# Patient Record
Sex: FEMALE | Race: BLACK OR AFRICAN AMERICAN | URBAN - METROPOLITAN AREA
[De-identification: names, ages, dates, MRNs, and addresses within clinical notes are randomized per-mention and may not be internally consistent; named-entity substitution may affect disease eponyms.]

---

## 2021-09-13 ENCOUNTER — HISTORICAL (OUTPATIENT)
Dept: ADMINISTRATIVE | Facility: HOSPITAL | Age: 81
End: 2021-09-13

## 2021-09-23 ENCOUNTER — HISTORICAL (OUTPATIENT)
Dept: RADIOLOGY | Facility: HOSPITAL | Age: 81
End: 2021-09-23

## 2021-11-30 LAB
ABS NEUT (OLG): 11.12 X10(3)/MCL (ref 2.1–9.2)
ALBUMIN SERPL-MCNC: 2.8 GM/DL (ref 3.4–4.8)
ALBUMIN/GLOB SERPL: 0.6 RATIO (ref 1.1–2)
ALP SERPL-CCNC: 143 UNIT/L (ref 40–150)
ALT SERPL-CCNC: 51 UNIT/L (ref 0–55)
AST SERPL-CCNC: 58 UNIT/L (ref 5–34)
BASOPHILS # BLD AUTO: 0.06 X10(3)/MCL (ref 0–0.2)
BASOPHILS NFR BLD AUTO: 0.4 % (ref 0–1)
BILIRUB SERPL-MCNC: 1 MG/DL (ref 0.2–1.2)
BILIRUBIN DIRECT+TOT PNL SERPL-MCNC: 0.5 MG/DL (ref 0–0.5)
BILIRUBIN DIRECT+TOT PNL SERPL-MCNC: 0.5 MG/DL (ref 0–0.8)
BUN SERPL-MCNC: 10.6 MG/DL (ref 9.8–20.1)
CALCIUM SERPL-MCNC: 9.7 MG/DL (ref 8.4–10.2)
CHLORIDE SERPL-SCNC: 105 MMOL/L (ref 98–107)
CHOLEST SERPL-MCNC: 111 MG/DL
CHOLEST/HDLC SERPL: 4 {RATIO} (ref 0–5)
CO2 SERPL-SCNC: 26 MMOL/L (ref 23–31)
CREAT SERPL-MCNC: 0.86 MG/DL (ref 0.57–1.11)
EOSINOPHIL # BLD AUTO: 0.4 X10(3)/MCL (ref 0–0.9)
EOSINOPHIL NFR BLD AUTO: 2.6 % (ref 0–6.4)
ERYTHROCYTE [DISTWIDTH] IN BLOOD BY AUTOMATED COUNT: 13.5 % (ref 11.5–17)
FERRITIN SERPL-MCNC: 2173.4 NG/ML (ref 4.63–204)
GLOBULIN SER-MCNC: 4.5 GM/DL (ref 2.4–3.5)
GLUCOSE SERPL-MCNC: 93 MG/DL (ref 82–115)
HCT VFR BLD AUTO: 34 % (ref 37–47)
HDLC SERPL-MCNC: 31 MG/DL (ref 40–60)
HGB BLD-MCNC: 11.2 GM/DL (ref 12–16)
IMM GRANULOCYTES # BLD AUTO: 0.09 10*3/UL (ref 0–0.02)
IMM GRANULOCYTES NFR BLD AUTO: 0.6 % (ref 0–0.43)
IRON SATN MFR SERPL: 27 % (ref 20–50)
IRON SERPL-MCNC: 39 UG/DL (ref 50–170)
LDLC SERPL CALC-MCNC: 58 MG/DL (ref 50–140)
LYMPHOCYTES # BLD AUTO: 2.98 X10(3)/MCL (ref 0.6–4.6)
LYMPHOCYTES NFR BLD AUTO: 19.6 % (ref 16–44)
MCH RBC QN AUTO: 31.2 PG (ref 27–31)
MCHC RBC AUTO-ENTMCNC: 32.9 GM/DL (ref 33–36)
MCV RBC AUTO: 94.7 FL (ref 80–94)
MONOCYTES # BLD AUTO: 0.56 X10(3)/MCL (ref 0.1–1.3)
MONOCYTES NFR BLD AUTO: 3.7 % (ref 4–12.1)
NEUTROPHILS # BLD AUTO: 11.12 X10(3)/MCL (ref 2.1–9.2)
NEUTROPHILS NFR BLD AUTO: 73.1 % (ref 43–73)
NRBC BLD AUTO-RTO: 0 % (ref 0–0.2)
PLATELET # BLD AUTO: 289 X10(3)/MCL (ref 130–400)
PMV BLD AUTO: 9.2 FL (ref 7.4–10.4)
POTASSIUM SERPL-SCNC: 4.2 MMOL/L (ref 3.5–5.1)
PREALB SERPL-MCNC: 10.8 MG/DL (ref 14–37)
PROT SERPL-MCNC: 7.3 GM/DL (ref 5.8–7.6)
RBC # BLD AUTO: 3.59 X10(6)/MCL (ref 4.2–5.4)
SODIUM SERPL-SCNC: 140 MMOL/L (ref 136–145)
TIBC SERPL-MCNC: 108 UG/DL (ref 70–310)
TIBC SERPL-MCNC: 147 UG/DL (ref 250–450)
TRANSFERRIN SERPL-MCNC: 131 MG/DL
TRIGL SERPL-MCNC: 109 MG/DL (ref 0–150)
VLDLC SERPL CALC-MCNC: 22 MG/DL
WBC # SPEC AUTO: 15.2 X10(3)/MCL (ref 4.5–11.5)

## 2021-12-01 LAB
BUN SERPL-MCNC: 12.5 MG/DL (ref 9.8–20.1)
CALCIUM SERPL-MCNC: 9.6 MG/DL (ref 8.4–10.2)
CHLORIDE SERPL-SCNC: 104 MMOL/L (ref 98–107)
CO2 SERPL-SCNC: 28 MMOL/L (ref 23–31)
CREAT SERPL-MCNC: 0.91 MG/DL (ref 0.57–1.11)
CREAT/UREA NIT SERPL: 14
GLUCOSE SERPL-MCNC: 97 MG/DL (ref 82–115)
POTASSIUM SERPL-SCNC: 4.4 MMOL/L (ref 3.5–5.1)
SODIUM SERPL-SCNC: 141 MMOL/L (ref 136–145)

## 2021-12-03 LAB
BUN SERPL-MCNC: 12.3 MG/DL (ref 9.8–20.1)
CALCIUM SERPL-MCNC: 9.6 MG/DL (ref 8.4–10.2)
CHLORIDE SERPL-SCNC: 103 MMOL/L (ref 98–107)
CO2 SERPL-SCNC: 27 MMOL/L (ref 23–31)
CREAT SERPL-MCNC: 0.89 MG/DL (ref 0.57–1.11)
CREAT/UREA NIT SERPL: 14
GLUCOSE SERPL-MCNC: 93 MG/DL (ref 82–115)
POTASSIUM SERPL-SCNC: 4.2 MMOL/L (ref 3.5–5.1)
SODIUM SERPL-SCNC: 138 MMOL/L (ref 136–145)

## 2021-12-06 LAB
ABS NEUT (OLG): 10.85 X10(3)/MCL (ref 2.1–9.2)
ALBUMIN SERPL-MCNC: 2.8 GM/DL (ref 3.4–4.8)
ALBUMIN/GLOB SERPL: 0.7 RATIO (ref 1.1–2)
ALP SERPL-CCNC: 146 UNIT/L (ref 40–150)
ALT SERPL-CCNC: 52 UNIT/L (ref 0–55)
AST SERPL-CCNC: 51 UNIT/L (ref 5–34)
BASOPHILS # BLD AUTO: 0.08 X10(3)/MCL (ref 0–0.2)
BASOPHILS NFR BLD AUTO: 0.5 % (ref 0–1)
BILIRUB SERPL-MCNC: 0.7 MG/DL (ref 0.2–1.2)
BILIRUBIN DIRECT+TOT PNL SERPL-MCNC: 0.3 MG/DL (ref 0–0.5)
BILIRUBIN DIRECT+TOT PNL SERPL-MCNC: 0.4 MG/DL (ref 0–0.8)
BUN SERPL-MCNC: 12.2 MG/DL (ref 9.8–20.1)
BUN SERPL-MCNC: 12.2 MG/DL (ref 9.8–20.1)
CALCIUM SERPL-MCNC: 10 MG/DL (ref 8.4–10.2)
CALCIUM SERPL-MCNC: 10 MG/DL (ref 8.4–10.2)
CHLORIDE SERPL-SCNC: 100 MMOL/L (ref 98–107)
CHLORIDE SERPL-SCNC: 100 MMOL/L (ref 98–107)
CO2 SERPL-SCNC: 27 MMOL/L (ref 23–31)
CO2 SERPL-SCNC: 27 MMOL/L (ref 23–31)
CREAT SERPL-MCNC: 0.87 MG/DL (ref 0.57–1.11)
CREAT SERPL-MCNC: 0.87 MG/DL (ref 0.57–1.11)
CREAT/UREA NIT SERPL: 14
EOSINOPHIL # BLD AUTO: 0.39 X10(3)/MCL (ref 0–0.9)
EOSINOPHIL NFR BLD AUTO: 2.6 % (ref 0–6.4)
ERYTHROCYTE [DISTWIDTH] IN BLOOD BY AUTOMATED COUNT: 13.2 % (ref 11.5–17)
GLOBULIN SER-MCNC: 4.2 GM/DL (ref 2.4–3.5)
GLUCOSE SERPL-MCNC: 92 MG/DL (ref 82–115)
GLUCOSE SERPL-MCNC: 92 MG/DL (ref 82–115)
HCT VFR BLD AUTO: 30.2 % (ref 37–47)
HGB BLD-MCNC: 10 GM/DL (ref 12–16)
IMM GRANULOCYTES # BLD AUTO: 0.09 10*3/UL (ref 0–0.02)
IMM GRANULOCYTES NFR BLD AUTO: 0.6 % (ref 0–0.43)
LYMPHOCYTES # BLD AUTO: 3.09 X10(3)/MCL (ref 0.6–4.6)
LYMPHOCYTES NFR BLD AUTO: 20.3 % (ref 16–44)
MCH RBC QN AUTO: 31 PG (ref 27–31)
MCHC RBC AUTO-ENTMCNC: 33.1 GM/DL (ref 33–36)
MCV RBC AUTO: 93.5 FL (ref 80–94)
MONOCYTES # BLD AUTO: 0.73 X10(3)/MCL (ref 0.1–1.3)
MONOCYTES NFR BLD AUTO: 4.8 % (ref 4–12.1)
NEUTROPHILS # BLD AUTO: 10.85 X10(3)/MCL (ref 2.1–9.2)
NEUTROPHILS NFR BLD AUTO: 71.2 % (ref 43–73)
NRBC BLD AUTO-RTO: 0 % (ref 0–0.2)
PLATELET # BLD AUTO: 255 X10(3)/MCL (ref 130–400)
PMV BLD AUTO: 9.2 FL (ref 7.4–10.4)
POTASSIUM SERPL-SCNC: 4 MMOL/L (ref 3.5–5.1)
POTASSIUM SERPL-SCNC: 4 MMOL/L (ref 3.5–5.1)
PREALB SERPL-MCNC: 12.4 MG/DL (ref 14–37)
PROT SERPL-MCNC: 7 GM/DL (ref 5.8–7.6)
RBC # BLD AUTO: 3.23 X10(6)/MCL (ref 4.2–5.4)
SODIUM SERPL-SCNC: 137 MMOL/L (ref 136–145)
SODIUM SERPL-SCNC: 137 MMOL/L (ref 136–145)
WBC # SPEC AUTO: 15.2 X10(3)/MCL (ref 4.5–11.5)

## 2021-12-08 ENCOUNTER — HISTORICAL (OUTPATIENT)
Dept: ADMINISTRATIVE | Facility: HOSPITAL | Age: 81
End: 2021-12-08

## 2021-12-08 LAB
APPEARANCE, UA: ABNORMAL
BILIRUB UR QL STRIP: NEGATIVE
BUN SERPL-MCNC: 15.4 MG/DL (ref 9.8–20.1)
CALCIUM SERPL-MCNC: 10.3 MG/DL (ref 8.4–10.2)
CHLORIDE SERPL-SCNC: 101 MMOL/L (ref 98–107)
CO2 SERPL-SCNC: 26 MMOL/L (ref 23–31)
COLOR UR: YELLOW
CREAT SERPL-MCNC: 0.87 MG/DL (ref 0.57–1.11)
CREAT/UREA NIT SERPL: 18
GLUCOSE (UA): NEGATIVE
GLUCOSE SERPL-MCNC: 89 MG/DL (ref 82–115)
HGB UR QL STRIP: NEGATIVE
KETONES UR QL STRIP: NEGATIVE
LEUKOCYTE ESTERASE UR QL STRIP: NEGATIVE
NITRITE UR QL STRIP: NEGATIVE
PH UR STRIP: 7.5 [PH] (ref 5–7)
POTASSIUM SERPL-SCNC: 3.8 MMOL/L (ref 3.5–5.1)
PROT UR QL STRIP: NEGATIVE
SODIUM SERPL-SCNC: 137 MMOL/L (ref 136–145)
SP GR UR STRIP: 1.01 (ref 1–1.03)
UROBILINOGEN UR STRIP-ACNC: NEGATIVE

## 2021-12-09 LAB
ABS NEUT (OLG): 12.93 X10(3)/MCL (ref 2.1–9.2)
ALBUMIN SERPL-MCNC: 2.8 GM/DL (ref 3.4–4.8)
ALBUMIN/GLOB SERPL: 0.6 RATIO (ref 1.1–2)
ALP SERPL-CCNC: 144 UNIT/L (ref 40–150)
ALT SERPL-CCNC: 53 UNIT/L (ref 0–55)
AST SERPL-CCNC: 48 UNIT/L (ref 5–34)
BASOPHILS # BLD AUTO: 0.09 X10(3)/MCL (ref 0–0.2)
BASOPHILS NFR BLD AUTO: 0.5 % (ref 0–1)
BILIRUB SERPL-MCNC: 0.7 MG/DL (ref 0.2–1.2)
BILIRUBIN DIRECT+TOT PNL SERPL-MCNC: 0.3 MG/DL (ref 0–0.8)
BILIRUBIN DIRECT+TOT PNL SERPL-MCNC: 0.4 MG/DL (ref 0–0.5)
BUN SERPL-MCNC: 14.1 MG/DL (ref 9.8–20.1)
CALCIUM SERPL-MCNC: 9.8 MG/DL (ref 8.4–10.2)
CHLORIDE SERPL-SCNC: 102 MMOL/L (ref 98–107)
CO2 SERPL-SCNC: 24 MMOL/L (ref 23–31)
CREAT SERPL-MCNC: 0.81 MG/DL (ref 0.57–1.11)
EOSINOPHIL # BLD AUTO: 0.27 X10(3)/MCL (ref 0–0.9)
EOSINOPHIL NFR BLD AUTO: 1.6 % (ref 0–6.4)
ERYTHROCYTE [DISTWIDTH] IN BLOOD BY AUTOMATED COUNT: 13.1 % (ref 11.5–17)
GLOBULIN SER-MCNC: 4.6 GM/DL (ref 2.4–3.5)
GLUCOSE SERPL-MCNC: 96 MG/DL (ref 82–115)
HCT VFR BLD AUTO: 32.7 % (ref 37–47)
HGB BLD-MCNC: 10.9 GM/DL (ref 12–16)
IMM GRANULOCYTES # BLD AUTO: 0.1 10*3/UL (ref 0–0.02)
IMM GRANULOCYTES NFR BLD AUTO: 0.6 % (ref 0–0.43)
LYMPHOCYTES # BLD AUTO: 3 X10(3)/MCL (ref 0.6–4.6)
LYMPHOCYTES NFR BLD AUTO: 17.4 % (ref 16–44)
MCH RBC QN AUTO: 31.1 PG (ref 27–31)
MCHC RBC AUTO-ENTMCNC: 33.3 GM/DL (ref 33–36)
MCV RBC AUTO: 93.2 FL (ref 80–94)
MONOCYTES # BLD AUTO: 0.87 X10(3)/MCL (ref 0.1–1.3)
MONOCYTES NFR BLD AUTO: 5 % (ref 4–12.1)
NEUTROPHILS # BLD AUTO: 12.93 X10(3)/MCL (ref 2.1–9.2)
NEUTROPHILS NFR BLD AUTO: 74.9 % (ref 43–73)
NRBC BLD AUTO-RTO: 0 % (ref 0–0.2)
PLATELET # BLD AUTO: 264 X10(3)/MCL (ref 130–400)
PMV BLD AUTO: 10.4 FL (ref 7.4–10.4)
POTASSIUM SERPL-SCNC: 3.7 MMOL/L (ref 3.5–5.1)
PROT SERPL-MCNC: 7.4 GM/DL (ref 5.8–7.6)
RBC # BLD AUTO: 3.51 X10(6)/MCL (ref 4.2–5.4)
SODIUM SERPL-SCNC: 138 MMOL/L (ref 136–145)
WBC # SPEC AUTO: 17.3 X10(3)/MCL (ref 4.5–11.5)

## 2021-12-10 LAB
ABS NEUT (OLG): 14.25 X10(3)/MCL (ref 2.1–9.2)
BASOPHILS # BLD AUTO: 0.06 X10(3)/MCL (ref 0–0.2)
BASOPHILS NFR BLD AUTO: 0.3 % (ref 0–1)
BUN SERPL-MCNC: 15.4 MG/DL (ref 9.8–20.1)
CALCIUM SERPL-MCNC: 10.3 MG/DL (ref 8.4–10.2)
CHLORIDE SERPL-SCNC: 102 MMOL/L (ref 98–107)
CO2 SERPL-SCNC: 25 MMOL/L (ref 23–31)
CREAT SERPL-MCNC: 0.85 MG/DL (ref 0.57–1.11)
CREAT/UREA NIT SERPL: 18
EOSINOPHIL # BLD AUTO: 0.22 X10(3)/MCL (ref 0–0.9)
EOSINOPHIL NFR BLD AUTO: 1.2 % (ref 0–6.4)
ERYTHROCYTE [DISTWIDTH] IN BLOOD BY AUTOMATED COUNT: 13.2 % (ref 11.5–17)
GLUCOSE SERPL-MCNC: 94 MG/DL (ref 82–115)
HCT VFR BLD AUTO: 29.6 % (ref 37–47)
HGB BLD-MCNC: 9.9 GM/DL (ref 12–16)
IMM GRANULOCYTES # BLD AUTO: 0.13 10*3/UL (ref 0–0.02)
IMM GRANULOCYTES NFR BLD AUTO: 0.7 % (ref 0–0.43)
LYMPHOCYTES # BLD AUTO: 3.14 X10(3)/MCL (ref 0.6–4.6)
LYMPHOCYTES NFR BLD AUTO: 16.8 % (ref 16–44)
MAGNESIUM SERPL-MCNC: 1.9 MG/DL (ref 1.6–2.6)
MCH RBC QN AUTO: 30.6 PG (ref 27–31)
MCHC RBC AUTO-ENTMCNC: 33.4 GM/DL (ref 33–36)
MCV RBC AUTO: 91.4 FL (ref 80–94)
MONOCYTES # BLD AUTO: 0.91 X10(3)/MCL (ref 0.1–1.3)
MONOCYTES NFR BLD AUTO: 4.9 % (ref 4–12.1)
NEUTROPHILS # BLD AUTO: 14.25 X10(3)/MCL (ref 2.1–9.2)
NEUTROPHILS NFR BLD AUTO: 76.1 % (ref 43–73)
NRBC BLD AUTO-RTO: 0 % (ref 0–0.2)
PLATELET # BLD AUTO: 224 X10(3)/MCL (ref 130–400)
PMV BLD AUTO: 9.6 FL (ref 7.4–10.4)
POTASSIUM SERPL-SCNC: 4.1 MMOL/L (ref 3.5–5.1)
RBC # BLD AUTO: 3.24 X10(6)/MCL (ref 4.2–5.4)
SODIUM SERPL-SCNC: 138 MMOL/L (ref 136–145)
WBC # SPEC AUTO: 18.7 X10(3)/MCL (ref 4.5–11.5)

## 2021-12-11 LAB
ABS NEUT (OLG): 14.76 X10(3)/MCL (ref 2.1–9.2)
ALBUMIN SERPL-MCNC: 2.8 GM/DL (ref 3.4–4.8)
ALBUMIN/GLOB SERPL: 0.6 RATIO (ref 1.1–2)
ALP SERPL-CCNC: 149 UNIT/L (ref 40–150)
ALT SERPL-CCNC: 105 UNIT/L (ref 0–55)
AST SERPL-CCNC: 90 UNIT/L (ref 5–34)
BASOPHILS # BLD AUTO: 0.09 X10(3)/MCL (ref 0–0.2)
BASOPHILS NFR BLD AUTO: 0.5 % (ref 0–1)
BILIRUB SERPL-MCNC: 0.7 MG/DL (ref 0.2–1.2)
BILIRUBIN DIRECT+TOT PNL SERPL-MCNC: 0.3 MG/DL (ref 0–0.5)
BILIRUBIN DIRECT+TOT PNL SERPL-MCNC: 0.4 MG/DL (ref 0–0.8)
BUN SERPL-MCNC: 15 MG/DL (ref 9.8–20.1)
CALCIUM SERPL-MCNC: 9.8 MG/DL (ref 8.4–10.2)
CHLORIDE SERPL-SCNC: 103 MMOL/L (ref 98–107)
CO2 SERPL-SCNC: 24 MMOL/L (ref 23–31)
CREAT SERPL-MCNC: 0.84 MG/DL (ref 0.57–1.11)
EOSINOPHIL # BLD AUTO: 0.35 X10(3)/MCL (ref 0–0.9)
EOSINOPHIL NFR BLD AUTO: 1.8 % (ref 0–6.4)
ERYTHROCYTE [DISTWIDTH] IN BLOOD BY AUTOMATED COUNT: 13.1 % (ref 11.5–17)
GLOBULIN SER-MCNC: 4.4 GM/DL (ref 2.4–3.5)
GLUCOSE SERPL-MCNC: 96 MG/DL (ref 82–115)
HCT VFR BLD AUTO: 28.6 % (ref 37–47)
HGB BLD-MCNC: 9.8 GM/DL (ref 12–16)
IMM GRANULOCYTES # BLD AUTO: 0.1 10*3/UL (ref 0–0.02)
IMM GRANULOCYTES NFR BLD AUTO: 0.5 % (ref 0–0.43)
LYMPHOCYTES # BLD AUTO: 3 X10(3)/MCL (ref 0.6–4.6)
LYMPHOCYTES NFR BLD AUTO: 15.7 % (ref 16–44)
MCH RBC QN AUTO: 31.6 PG (ref 27–31)
MCHC RBC AUTO-ENTMCNC: 34.3 GM/DL (ref 33–36)
MCV RBC AUTO: 92.3 FL (ref 80–94)
MONOCYTES # BLD AUTO: 0.84 X10(3)/MCL (ref 0.1–1.3)
MONOCYTES NFR BLD AUTO: 4.4 % (ref 4–12.1)
NEUTROPHILS # BLD AUTO: 14.76 X10(3)/MCL (ref 2.1–9.2)
NEUTROPHILS NFR BLD AUTO: 77.1 % (ref 43–73)
NRBC BLD AUTO-RTO: 0 % (ref 0–0.2)
PLATELET # BLD AUTO: 234 X10(3)/MCL (ref 130–400)
PMV BLD AUTO: 9.4 FL (ref 7.4–10.4)
POTASSIUM SERPL-SCNC: 3.5 MMOL/L (ref 3.5–5.1)
PROT SERPL-MCNC: 7.2 GM/DL (ref 5.8–7.6)
RBC # BLD AUTO: 3.1 X10(6)/MCL (ref 4.2–5.4)
SODIUM SERPL-SCNC: 138 MMOL/L (ref 136–145)
WBC # SPEC AUTO: 19.1 X10(3)/MCL (ref 4.5–11.5)

## 2021-12-13 LAB
ABS NEUT (OLG): 11.37 X10(3)/MCL (ref 2.1–9.2)
ALBUMIN SERPL-MCNC: 2.7 GM/DL (ref 3.4–4.8)
ALBUMIN/GLOB SERPL: 0.6 RATIO (ref 1.1–2)
ALP SERPL-CCNC: 158 UNIT/L (ref 40–150)
ALT SERPL-CCNC: 99 UNIT/L (ref 0–55)
AST SERPL-CCNC: 74 UNIT/L (ref 5–34)
BASOPHILS # BLD AUTO: 0.07 X10(3)/MCL (ref 0–0.2)
BASOPHILS NFR BLD AUTO: 0.4 % (ref 0–1)
BILIRUB SERPL-MCNC: 0.7 MG/DL (ref 0.2–1.2)
BILIRUBIN DIRECT+TOT PNL SERPL-MCNC: 0.3 MG/DL (ref 0–0.5)
BILIRUBIN DIRECT+TOT PNL SERPL-MCNC: 0.4 MG/DL (ref 0–0.8)
BUN SERPL-MCNC: 11.4 MG/DL (ref 9.8–20.1)
CALCIUM SERPL-MCNC: 10.1 MG/DL (ref 8.4–10.2)
CHLORIDE SERPL-SCNC: 104 MMOL/L (ref 98–107)
CO2 SERPL-SCNC: 26 MMOL/L (ref 23–31)
CREAT SERPL-MCNC: 0.88 MG/DL (ref 0.57–1.11)
EOSINOPHIL # BLD AUTO: 0.43 X10(3)/MCL (ref 0–0.9)
EOSINOPHIL NFR BLD AUTO: 2.7 % (ref 0–6.4)
ERYTHROCYTE [DISTWIDTH] IN BLOOD BY AUTOMATED COUNT: 13 % (ref 11.5–17)
GLOBULIN SER-MCNC: 4.3 GM/DL (ref 2.4–3.5)
GLUCOSE SERPL-MCNC: 87 MG/DL (ref 82–115)
HCT VFR BLD AUTO: 28.1 % (ref 37–47)
HGB BLD-MCNC: 9.2 GM/DL (ref 12–16)
IMM GRANULOCYTES # BLD AUTO: 0.1 10*3/UL (ref 0–0.02)
IMM GRANULOCYTES NFR BLD AUTO: 0.6 % (ref 0–0.43)
LYMPHOCYTES # BLD AUTO: 2.95 X10(3)/MCL (ref 0.6–4.6)
LYMPHOCYTES NFR BLD AUTO: 18.7 % (ref 16–44)
MCH RBC QN AUTO: 30.2 PG (ref 27–31)
MCHC RBC AUTO-ENTMCNC: 32.7 GM/DL (ref 33–36)
MCV RBC AUTO: 92.1 FL (ref 80–94)
MONOCYTES # BLD AUTO: 0.85 X10(3)/MCL (ref 0.1–1.3)
MONOCYTES NFR BLD AUTO: 5.4 % (ref 4–12.1)
NEUTROPHILS # BLD AUTO: 11.37 X10(3)/MCL (ref 2.1–9.2)
NEUTROPHILS NFR BLD AUTO: 72.2 % (ref 43–73)
NRBC BLD AUTO-RTO: 0 % (ref 0–0.2)
PLATELET # BLD AUTO: 191 X10(3)/MCL (ref 130–400)
PMV BLD AUTO: 9.9 FL (ref 7.4–10.4)
POTASSIUM SERPL-SCNC: 3.6 MMOL/L (ref 3.5–5.1)
PREALB SERPL-MCNC: 9 MG/DL (ref 14–37)
PROT SERPL-MCNC: 7 GM/DL (ref 5.8–7.6)
RBC # BLD AUTO: 3.05 X10(6)/MCL (ref 4.2–5.4)
SODIUM SERPL-SCNC: 140 MMOL/L (ref 136–145)
WBC # SPEC AUTO: 15.8 X10(3)/MCL (ref 4.5–11.5)

## 2021-12-14 LAB
PHOSPHATE SERPL-MCNC: 3.8 MG/DL (ref 2.3–4.7)
PTH-INTACT SERPL-MCNC: 17 PG/ML (ref 8.7–77.1)

## 2021-12-15 LAB
BUN SERPL-MCNC: 14 MG/DL (ref 9.8–20.1)
CALCIUM SERPL-MCNC: 10.4 MG/DL (ref 8.4–10.2)
CHLORIDE SERPL-SCNC: 103 MMOL/L (ref 98–107)
CO2 SERPL-SCNC: 24 MMOL/L (ref 23–31)
CREAT SERPL-MCNC: 0.88 MG/DL (ref 0.57–1.11)
CREAT/UREA NIT SERPL: 16
GLUCOSE SERPL-MCNC: 101 MG/DL (ref 82–115)
POTASSIUM SERPL-SCNC: 3.9 MMOL/L (ref 3.5–5.1)
SODIUM SERPL-SCNC: 139 MMOL/L (ref 136–145)

## 2021-12-16 LAB
BUN SERPL-MCNC: 12.8 MG/DL (ref 9.8–20.1)
CALCIUM SERPL-MCNC: 10.5 MG/DL (ref 8.4–10.2)
CHLORIDE SERPL-SCNC: 101 MMOL/L (ref 98–107)
CO2 SERPL-SCNC: 24 MMOL/L (ref 23–31)
CREAT SERPL-MCNC: 0.86 MG/DL (ref 0.57–1.11)
CREAT/UREA NIT SERPL: 15
GLUCOSE SERPL-MCNC: 95 MG/DL (ref 82–115)
POTASSIUM SERPL-SCNC: 3.9 MMOL/L (ref 3.5–5.1)
SODIUM SERPL-SCNC: 136 MMOL/L (ref 136–145)

## 2021-12-17 LAB
ABS NEUT (OLG): 13.25 X10(3)/MCL (ref 2.1–9.2)
ALBUMIN SERPL-MCNC: 2.6 GM/DL (ref 3.4–4.8)
ALBUMIN/GLOB SERPL: 0.6 RATIO (ref 1.1–2)
ALP SERPL-CCNC: 151 UNIT/L (ref 40–150)
ALT SERPL-CCNC: 68 UNIT/L (ref 0–55)
AST SERPL-CCNC: 48 UNIT/L (ref 5–34)
BASOPHILS # BLD AUTO: 0.05 X10(3)/MCL (ref 0–0.2)
BASOPHILS NFR BLD AUTO: 0.3 % (ref 0–1)
BILIRUB SERPL-MCNC: 0.7 MG/DL (ref 0.2–1.2)
BILIRUBIN DIRECT+TOT PNL SERPL-MCNC: 0.3 MG/DL (ref 0–0.5)
BILIRUBIN DIRECT+TOT PNL SERPL-MCNC: 0.4 MG/DL (ref 0–0.8)
BUN SERPL-MCNC: 14.4 MG/DL (ref 9.8–20.1)
CALCIUM SERPL-MCNC: 10.2 MG/DL (ref 8.4–10.2)
CHLORIDE SERPL-SCNC: 100 MMOL/L (ref 98–107)
CO2 SERPL-SCNC: 27 MMOL/L (ref 23–31)
CREAT SERPL-MCNC: 0.97 MG/DL (ref 0.57–1.11)
EOSINOPHIL # BLD AUTO: 0.19 X10(3)/MCL (ref 0–0.9)
EOSINOPHIL NFR BLD AUTO: 1.1 % (ref 0–6.4)
ERYTHROCYTE [DISTWIDTH] IN BLOOD BY AUTOMATED COUNT: 13.2 % (ref 11.5–17)
GLOBULIN SER-MCNC: 4.2 GM/DL (ref 2.4–3.5)
GLUCOSE SERPL-MCNC: 99 MG/DL (ref 82–115)
HCT VFR BLD AUTO: 28.2 % (ref 37–47)
HGB BLD-MCNC: 9.4 GM/DL (ref 12–16)
IMM GRANULOCYTES # BLD AUTO: 0.13 10*3/UL (ref 0–0.02)
IMM GRANULOCYTES NFR BLD AUTO: 0.8 % (ref 0–0.43)
LYMPHOCYTES # BLD AUTO: 2.22 X10(3)/MCL (ref 0.6–4.6)
LYMPHOCYTES NFR BLD AUTO: 13.4 % (ref 16–44)
MCH RBC QN AUTO: 30.3 PG (ref 27–31)
MCHC RBC AUTO-ENTMCNC: 33.3 GM/DL (ref 33–36)
MCV RBC AUTO: 91 FL (ref 80–94)
MONOCYTES # BLD AUTO: 0.7 X10(3)/MCL (ref 0.1–1.3)
MONOCYTES NFR BLD AUTO: 4.2 % (ref 4–12.1)
NEUTROPHILS # BLD AUTO: 13.25 X10(3)/MCL (ref 2.1–9.2)
NEUTROPHILS NFR BLD AUTO: 80.2 % (ref 43–73)
NRBC BLD AUTO-RTO: 0 % (ref 0–0.2)
PLATELET # BLD AUTO: 140 X10(3)/MCL (ref 130–400)
PMV BLD AUTO: 9.9 FL (ref 7.4–10.4)
POTASSIUM SERPL-SCNC: 3.8 MMOL/L (ref 3.5–5.1)
PROT SERPL-MCNC: 6.8 GM/DL (ref 5.8–7.6)
RBC # BLD AUTO: 3.1 X10(6)/MCL (ref 4.2–5.4)
SODIUM SERPL-SCNC: 137 MMOL/L (ref 136–145)
WBC # SPEC AUTO: 16.5 X10(3)/MCL (ref 4.5–11.5)

## 2021-12-20 LAB
ABS NEUT (OLG): 17.65 X10(3)/MCL (ref 2.1–9.2)
ALBUMIN SERPL-MCNC: 2.9 GM/DL (ref 3.4–4.8)
ALBUMIN/GLOB SERPL: 0.6 RATIO (ref 1.1–2)
ALP SERPL-CCNC: 186 UNIT/L (ref 40–150)
ALT SERPL-CCNC: 61 UNIT/L (ref 0–55)
APPEARANCE, UA: ABNORMAL
AST SERPL-CCNC: 49 UNIT/L (ref 5–34)
BACTERIA SPEC CULT: ABNORMAL /HPF
BASOPHILS # BLD AUTO: 0.07 X10(3)/MCL (ref 0–0.2)
BASOPHILS NFR BLD AUTO: 0.3 % (ref 0–1)
BILIRUB SERPL-MCNC: 1.1 MG/DL (ref 0.2–1.2)
BILIRUB UR QL STRIP: NEGATIVE
BILIRUBIN DIRECT+TOT PNL SERPL-MCNC: 0.5 MG/DL (ref 0–0.5)
BILIRUBIN DIRECT+TOT PNL SERPL-MCNC: 0.6 MG/DL (ref 0–0.8)
BUN SERPL-MCNC: 14.7 MG/DL (ref 9.8–20.1)
CALCIUM SERPL-MCNC: 11.1 MG/DL (ref 8.4–10.2)
CHLORIDE SERPL-SCNC: 100 MMOL/L (ref 98–107)
CO2 SERPL-SCNC: 27 MMOL/L (ref 23–31)
COLOR UR: YELLOW
CREAT SERPL-MCNC: 0.85 MG/DL (ref 0.57–1.11)
EOSINOPHIL # BLD AUTO: 0.07 X10(3)/MCL (ref 0–0.9)
EOSINOPHIL NFR BLD AUTO: 0.3 % (ref 0–6.4)
ERYTHROCYTE [DISTWIDTH] IN BLOOD BY AUTOMATED COUNT: 13.3 % (ref 11.5–17)
GLOBULIN SER-MCNC: 4.7 GM/DL (ref 2.4–3.5)
GLUCOSE (UA): NEGATIVE
GLUCOSE SERPL-MCNC: 94 MG/DL (ref 82–115)
HCT VFR BLD AUTO: 31.8 % (ref 37–47)
HGB BLD-MCNC: 10.5 GM/DL (ref 12–16)
HGB UR QL STRIP: ABNORMAL
IMM GRANULOCYTES # BLD AUTO: 0.17 10*3/UL (ref 0–0.02)
IMM GRANULOCYTES NFR BLD AUTO: 0.8 % (ref 0–0.43)
KETONES UR QL STRIP: NEGATIVE
LEUKOCYTE ESTERASE UR QL STRIP: ABNORMAL
LYMPHOCYTES # BLD AUTO: 2.57 X10(3)/MCL (ref 0.6–4.6)
LYMPHOCYTES NFR BLD AUTO: 12 % (ref 16–44)
MCH RBC QN AUTO: 29.9 PG (ref 27–31)
MCHC RBC AUTO-ENTMCNC: 33 GM/DL (ref 33–36)
MCV RBC AUTO: 90.6 FL (ref 80–94)
MONOCYTES # BLD AUTO: 0.89 X10(3)/MCL (ref 0.1–1.3)
MONOCYTES NFR BLD AUTO: 4.2 % (ref 4–12.1)
NEUTROPHILS # BLD AUTO: 17.65 X10(3)/MCL (ref 2.1–9.2)
NEUTROPHILS NFR BLD AUTO: 82.4 % (ref 43–73)
NITRITE UR QL STRIP: POSITIVE
NRBC BLD AUTO-RTO: 0 % (ref 0–0.2)
PH UR STRIP: 7 [PH] (ref 5–7)
PLATELET # BLD AUTO: 183 X10(3)/MCL (ref 130–400)
PMV BLD AUTO: 10.3 FL (ref 7.4–10.4)
POTASSIUM SERPL-SCNC: 4 MMOL/L (ref 3.5–5.1)
PREALB SERPL-MCNC: 10.3 MG/DL (ref 14–37)
PROT SERPL-MCNC: 7.6 GM/DL (ref 5.8–7.6)
PROT UR QL STRIP: NEGATIVE
RBC # BLD AUTO: 3.51 X10(6)/MCL (ref 4.2–5.4)
RBC #/AREA URNS HPF: ABNORMAL /HPF
SODIUM SERPL-SCNC: 140 MMOL/L (ref 136–145)
SP GR UR STRIP: 1.02 (ref 1–1.03)
SQUAMOUS EPITHELIAL, UA: ABNORMAL /LPF
UROBILINOGEN UR STRIP-ACNC: NEGATIVE
WBC # SPEC AUTO: 21.4 X10(3)/MCL (ref 4.5–11.5)
WBC #/AREA URNS HPF: ABNORMAL /HPF

## 2021-12-21 LAB
ABS NEUT (OLG): 14.8 X10(3)/MCL (ref 2.1–9.2)
ALBUMIN SERPL-MCNC: 2.8 GM/DL (ref 3.4–4.8)
ALBUMIN/GLOB SERPL: 0.6 RATIO (ref 1.1–2)
ALP SERPL-CCNC: 177 UNIT/L (ref 40–150)
ALT SERPL-CCNC: 53 UNIT/L (ref 0–55)
AST SERPL-CCNC: 45 UNIT/L (ref 5–34)
BASOPHILS # BLD AUTO: 0.05 X10(3)/MCL (ref 0–0.2)
BASOPHILS NFR BLD AUTO: 0.3 % (ref 0–1)
BILIRUB SERPL-MCNC: 1 MG/DL (ref 0.2–1.2)
BILIRUBIN DIRECT+TOT PNL SERPL-MCNC: 0.5 MG/DL (ref 0–0.5)
BILIRUBIN DIRECT+TOT PNL SERPL-MCNC: 0.5 MG/DL (ref 0–0.8)
BUN SERPL-MCNC: 16.5 MG/DL (ref 9.8–20.1)
CALCIUM SERPL-MCNC: 11.2 MG/DL (ref 8.4–10.2)
CHLORIDE SERPL-SCNC: 103 MMOL/L (ref 98–107)
CO2 SERPL-SCNC: 27 MMOL/L (ref 23–31)
CREAT SERPL-MCNC: 0.86 MG/DL (ref 0.57–1.11)
EOSINOPHIL # BLD AUTO: 0.26 X10(3)/MCL (ref 0–0.9)
EOSINOPHIL NFR BLD AUTO: 1.4 % (ref 0–6.4)
ERYTHROCYTE [DISTWIDTH] IN BLOOD BY AUTOMATED COUNT: 13.4 % (ref 11.5–17)
GLOBULIN SER-MCNC: 4.4 GM/DL (ref 2.4–3.5)
GLUCOSE SERPL-MCNC: 95 MG/DL (ref 82–115)
HCT VFR BLD AUTO: 30.5 % (ref 37–47)
HGB BLD-MCNC: 10.1 GM/DL (ref 12–16)
IMM GRANULOCYTES # BLD AUTO: 0.12 10*3/UL (ref 0–0.02)
IMM GRANULOCYTES NFR BLD AUTO: 0.6 % (ref 0–0.43)
LYMPHOCYTES # BLD AUTO: 2.57 X10(3)/MCL (ref 0.6–4.6)
LYMPHOCYTES NFR BLD AUTO: 13.7 % (ref 16–44)
MCH RBC QN AUTO: 30.5 PG (ref 27–31)
MCHC RBC AUTO-ENTMCNC: 33.1 GM/DL (ref 33–36)
MCV RBC AUTO: 92.1 FL (ref 80–94)
MONOCYTES # BLD AUTO: 0.99 X10(3)/MCL (ref 0.1–1.3)
MONOCYTES NFR BLD AUTO: 5.3 % (ref 4–12.1)
NEUTROPHILS # BLD AUTO: 14.8 X10(3)/MCL (ref 2.1–9.2)
NEUTROPHILS NFR BLD AUTO: 78.7 % (ref 43–73)
NRBC BLD AUTO-RTO: 0 % (ref 0–0.2)
PLATELET # BLD AUTO: 182 X10(3)/MCL (ref 130–400)
PMV BLD AUTO: 9.7 FL (ref 7.4–10.4)
POTASSIUM SERPL-SCNC: 3.5 MMOL/L (ref 3.5–5.1)
PROT SERPL-MCNC: 7.2 GM/DL (ref 5.8–7.6)
RBC # BLD AUTO: 3.31 X10(6)/MCL (ref 4.2–5.4)
SODIUM SERPL-SCNC: 141 MMOL/L (ref 136–145)
WBC # SPEC AUTO: 18.8 X10(3)/MCL (ref 4.5–11.5)

## 2021-12-22 LAB
ABS NEUT (OLG): 14.81 X10(3)/MCL (ref 2.1–9.2)
ALBUMIN SERPL-MCNC: 2.5 GM/DL (ref 3.4–4.8)
ALBUMIN/GLOB SERPL: 0.6 RATIO (ref 1.1–2)
ALP SERPL-CCNC: 152 UNIT/L (ref 40–150)
ALT SERPL-CCNC: 53 UNIT/L (ref 0–55)
AST SERPL-CCNC: 45 UNIT/L (ref 5–34)
BASOPHILS # BLD AUTO: 0.05 X10(3)/MCL (ref 0–0.2)
BASOPHILS NFR BLD AUTO: 0.3 % (ref 0–1)
BILIRUB SERPL-MCNC: 0.9 MG/DL (ref 0.2–1.2)
BILIRUBIN DIRECT+TOT PNL SERPL-MCNC: 0.4 MG/DL (ref 0–0.8)
BILIRUBIN DIRECT+TOT PNL SERPL-MCNC: 0.5 MG/DL (ref 0–0.5)
BUN SERPL-MCNC: 19.1 MG/DL (ref 9.8–20.1)
CALCIUM SERPL-MCNC: 10.7 MG/DL (ref 8.4–10.2)
CHLORIDE SERPL-SCNC: 107 MMOL/L (ref 98–107)
CO2 SERPL-SCNC: 27 MMOL/L (ref 23–31)
CREAT SERPL-MCNC: 0.9 MG/DL (ref 0.57–1.11)
EOSINOPHIL # BLD AUTO: 0.32 X10(3)/MCL (ref 0–0.9)
EOSINOPHIL NFR BLD AUTO: 1.7 % (ref 0–6.4)
ERYTHROCYTE [DISTWIDTH] IN BLOOD BY AUTOMATED COUNT: 13.2 % (ref 11.5–17)
GLOBULIN SER-MCNC: 4.1 GM/DL (ref 2.4–3.5)
GLUCOSE SERPL-MCNC: 112 MG/DL (ref 82–115)
HCT VFR BLD AUTO: 27.8 % (ref 37–47)
HGB BLD-MCNC: 9.1 GM/DL (ref 12–16)
IMM GRANULOCYTES # BLD AUTO: 0.13 10*3/UL (ref 0–0.02)
IMM GRANULOCYTES NFR BLD AUTO: 0.7 % (ref 0–0.43)
LYMPHOCYTES # BLD AUTO: 2.11 X10(3)/MCL (ref 0.6–4.6)
LYMPHOCYTES NFR BLD AUTO: 11.4 % (ref 16–44)
MCH RBC QN AUTO: 31 PG (ref 27–31)
MCHC RBC AUTO-ENTMCNC: 32.7 GM/DL (ref 33–36)
MCV RBC AUTO: 94.6 FL (ref 80–94)
MONOCYTES # BLD AUTO: 1.07 X10(3)/MCL (ref 0.1–1.3)
MONOCYTES NFR BLD AUTO: 5.8 % (ref 4–12.1)
NEUTROPHILS # BLD AUTO: 14.81 X10(3)/MCL (ref 2.1–9.2)
NEUTROPHILS NFR BLD AUTO: 80.1 % (ref 43–73)
NRBC BLD AUTO-RTO: 0 % (ref 0–0.2)
PLATELET # BLD AUTO: 153 X10(3)/MCL (ref 130–400)
PMV BLD AUTO: 10.1 FL (ref 7.4–10.4)
POTASSIUM SERPL-SCNC: 3.4 MMOL/L (ref 3.5–5.1)
PROT SERPL-MCNC: 6.6 GM/DL (ref 5.8–7.6)
RBC # BLD AUTO: 2.94 X10(6)/MCL (ref 4.2–5.4)
SODIUM SERPL-SCNC: 143 MMOL/L (ref 136–145)
WBC # SPEC AUTO: 18.5 X10(3)/MCL (ref 4.5–11.5)

## 2021-12-23 LAB
ABS NEUT (OLG): 14.96 X10(3)/MCL (ref 2.1–9.2)
ALBUMIN SERPL-MCNC: 2.5 GM/DL (ref 3.4–4.8)
ALBUMIN/GLOB SERPL: 0.6 RATIO (ref 1.1–2)
ALP SERPL-CCNC: 140 UNIT/L (ref 40–150)
ALT SERPL-CCNC: 47 UNIT/L (ref 0–55)
AST SERPL-CCNC: 39 UNIT/L (ref 5–34)
BASOPHILS # BLD AUTO: 0.07 X10(3)/MCL (ref 0–0.2)
BASOPHILS NFR BLD AUTO: 0.4 % (ref 0–1)
BILIRUB SERPL-MCNC: 0.8 MG/DL (ref 0.2–1.2)
BILIRUBIN DIRECT+TOT PNL SERPL-MCNC: 0.4 MG/DL (ref 0–0.5)
BILIRUBIN DIRECT+TOT PNL SERPL-MCNC: 0.4 MG/DL (ref 0–0.8)
BUN SERPL-MCNC: 21.1 MG/DL (ref 9.8–20.1)
BUN SERPL-MCNC: 21.1 MG/DL (ref 9.8–20.1)
CALCIUM SERPL-MCNC: 10.7 MG/DL (ref 8.4–10.2)
CALCIUM SERPL-MCNC: 10.7 MG/DL (ref 8.4–10.2)
CHLORIDE SERPL-SCNC: 110 MMOL/L (ref 98–107)
CHLORIDE SERPL-SCNC: 110 MMOL/L (ref 98–107)
CO2 SERPL-SCNC: 27 MMOL/L (ref 23–31)
CO2 SERPL-SCNC: 27 MMOL/L (ref 23–31)
CREAT SERPL-MCNC: 0.93 MG/DL (ref 0.57–1.11)
CREAT SERPL-MCNC: 0.93 MG/DL (ref 0.57–1.11)
CREAT/UREA NIT SERPL: 23
EOSINOPHIL # BLD AUTO: 0.29 X10(3)/MCL (ref 0–0.9)
EOSINOPHIL NFR BLD AUTO: 1.6 % (ref 0–6.4)
ERYTHROCYTE [DISTWIDTH] IN BLOOD BY AUTOMATED COUNT: 13.3 % (ref 11.5–17)
GLOBULIN SER-MCNC: 3.9 GM/DL (ref 2.4–3.5)
GLUCOSE SERPL-MCNC: 113 MG/DL (ref 82–115)
GLUCOSE SERPL-MCNC: 113 MG/DL (ref 82–115)
HCT VFR BLD AUTO: 26.6 % (ref 37–47)
HGB BLD-MCNC: 8.8 GM/DL (ref 12–16)
IMM GRANULOCYTES # BLD AUTO: 0.16 10*3/UL (ref 0–0.02)
IMM GRANULOCYTES NFR BLD AUTO: 0.9 % (ref 0–0.43)
LYMPHOCYTES # BLD AUTO: 1.93 X10(3)/MCL (ref 0.6–4.6)
LYMPHOCYTES NFR BLD AUTO: 10.5 % (ref 16–44)
MCH RBC QN AUTO: 31.3 PG (ref 27–31)
MCHC RBC AUTO-ENTMCNC: 33.1 GM/DL (ref 33–36)
MCV RBC AUTO: 94.7 FL (ref 80–94)
MONOCYTES # BLD AUTO: 0.95 X10(3)/MCL (ref 0.1–1.3)
MONOCYTES NFR BLD AUTO: 5.2 % (ref 4–12.1)
NEUTROPHILS # BLD AUTO: 14.96 X10(3)/MCL (ref 2.1–9.2)
NEUTROPHILS NFR BLD AUTO: 81.4 % (ref 43–73)
NRBC BLD AUTO-RTO: 0 % (ref 0–0.2)
PLATELET # BLD AUTO: 153 X10(3)/MCL (ref 130–400)
PMV BLD AUTO: 9.9 FL (ref 7.4–10.4)
POTASSIUM SERPL-SCNC: 3.5 MMOL/L (ref 3.5–5.1)
POTASSIUM SERPL-SCNC: 3.5 MMOL/L (ref 3.5–5.1)
PROT SERPL-MCNC: 6.4 GM/DL (ref 5.8–7.6)
RBC # BLD AUTO: 2.81 X10(6)/MCL (ref 4.2–5.4)
SODIUM SERPL-SCNC: 144 MMOL/L (ref 136–145)
SODIUM SERPL-SCNC: 144 MMOL/L (ref 136–145)
WBC # SPEC AUTO: 18.4 X10(3)/MCL (ref 4.5–11.5)

## 2021-12-24 LAB
ABS NEUT (OLG): 16.34 X10(3)/MCL (ref 2.1–9.2)
ALBUMIN SERPL-MCNC: 2.6 GM/DL (ref 3.4–4.8)
ALBUMIN/GLOB SERPL: 0.6 RATIO (ref 1.1–2)
ALP SERPL-CCNC: 167 UNIT/L (ref 40–150)
ALT SERPL-CCNC: 57 UNIT/L (ref 0–55)
AST SERPL-CCNC: 56 UNIT/L (ref 5–34)
BASOPHILS # BLD AUTO: 0.07 X10(3)/MCL (ref 0–0.2)
BASOPHILS NFR BLD AUTO: 0.3 % (ref 0–1)
BILIRUB SERPL-MCNC: 0.8 MG/DL (ref 0.2–1.2)
BILIRUBIN DIRECT+TOT PNL SERPL-MCNC: 0.3 MG/DL (ref 0–0.5)
BILIRUBIN DIRECT+TOT PNL SERPL-MCNC: 0.5 MG/DL (ref 0–0.8)
BUN SERPL-MCNC: 21 MG/DL (ref 9.8–20.1)
CALCIUM SERPL-MCNC: 10.8 MG/DL (ref 8.4–10.2)
CHLORIDE SERPL-SCNC: 110 MMOL/L (ref 98–107)
CO2 SERPL-SCNC: 24 MMOL/L (ref 23–31)
CREAT SERPL-MCNC: 0.8 MG/DL (ref 0.57–1.11)
EOSINOPHIL # BLD AUTO: 0.3 X10(3)/MCL (ref 0–0.9)
EOSINOPHIL NFR BLD AUTO: 1.5 % (ref 0–6.4)
ERYTHROCYTE [DISTWIDTH] IN BLOOD BY AUTOMATED COUNT: 13.6 % (ref 11.5–17)
GLOBULIN SER-MCNC: 4.6 GM/DL (ref 2.4–3.5)
GLUCOSE SERPL-MCNC: 95 MG/DL (ref 82–115)
HCT VFR BLD AUTO: 29.9 % (ref 37–47)
HGB BLD-MCNC: 9.7 GM/DL (ref 12–16)
IMM GRANULOCYTES # BLD AUTO: 0.21 10*3/UL (ref 0–0.02)
IMM GRANULOCYTES NFR BLD AUTO: 1 % (ref 0–0.43)
LYMPHOCYTES # BLD AUTO: 2.21 X10(3)/MCL (ref 0.6–4.6)
LYMPHOCYTES NFR BLD AUTO: 11 % (ref 16–44)
MCH RBC QN AUTO: 30.9 PG (ref 27–31)
MCHC RBC AUTO-ENTMCNC: 32.4 GM/DL (ref 33–36)
MCV RBC AUTO: 95.2 FL (ref 80–94)
MONOCYTES # BLD AUTO: 1.02 X10(3)/MCL (ref 0.1–1.3)
MONOCYTES NFR BLD AUTO: 5.1 % (ref 4–12.1)
NEUTROPHILS # BLD AUTO: 16.34 X10(3)/MCL (ref 2.1–9.2)
NEUTROPHILS NFR BLD AUTO: 81.1 % (ref 43–73)
NRBC BLD AUTO-RTO: 0 % (ref 0–0.2)
PLATELET # BLD AUTO: 183 X10(3)/MCL (ref 130–400)
PMV BLD AUTO: 10.1 FL (ref 7.4–10.4)
POTASSIUM SERPL-SCNC: 3.7 MMOL/L (ref 3.5–5.1)
PROT SERPL-MCNC: 7.2 GM/DL (ref 5.8–7.6)
RBC # BLD AUTO: 3.14 X10(6)/MCL (ref 4.2–5.4)
SODIUM SERPL-SCNC: 145 MMOL/L (ref 136–145)
WBC # SPEC AUTO: 20.2 X10(3)/MCL (ref 4.5–11.5)

## 2021-12-25 LAB
ABS NEUT (OLG): 17.09 X10(3)/MCL (ref 2.1–9.2)
ALBUMIN SERPL-MCNC: 2.4 GM/DL (ref 3.4–4.8)
ALBUMIN/GLOB SERPL: 0.5 RATIO (ref 1.1–2)
ALP SERPL-CCNC: 151 UNIT/L (ref 40–150)
ALT SERPL-CCNC: 55 UNIT/L (ref 0–55)
AST SERPL-CCNC: 51 UNIT/L (ref 5–34)
BASOPHILS # BLD AUTO: 0.06 X10(3)/MCL (ref 0–0.2)
BASOPHILS NFR BLD AUTO: 0.3 % (ref 0–1)
BILIRUB SERPL-MCNC: 0.9 MG/DL (ref 0.2–1.2)
BILIRUBIN DIRECT+TOT PNL SERPL-MCNC: 0.4 MG/DL (ref 0–0.8)
BILIRUBIN DIRECT+TOT PNL SERPL-MCNC: 0.5 MG/DL (ref 0–0.5)
BUN SERPL-MCNC: 15.1 MG/DL (ref 9.8–20.1)
CALCIUM SERPL-MCNC: 10.6 MG/DL (ref 8.4–10.2)
CHLORIDE SERPL-SCNC: 112 MMOL/L (ref 98–107)
CO2 SERPL-SCNC: 23 MMOL/L (ref 23–31)
CREAT SERPL-MCNC: 0.79 MG/DL (ref 0.57–1.11)
EOSINOPHIL # BLD AUTO: 0.41 X10(3)/MCL (ref 0–0.9)
EOSINOPHIL NFR BLD AUTO: 1.9 % (ref 0–6.4)
ERYTHROCYTE [DISTWIDTH] IN BLOOD BY AUTOMATED COUNT: 13.7 % (ref 11.5–17)
GLOBULIN SER-MCNC: 4.5 GM/DL (ref 2.4–3.5)
GLUCOSE SERPL-MCNC: 92 MG/DL (ref 82–115)
HCT VFR BLD AUTO: 27.9 % (ref 37–47)
HGB BLD-MCNC: 9.1 GM/DL (ref 12–16)
IMM GRANULOCYTES # BLD AUTO: 0.18 10*3/UL (ref 0–0.02)
IMM GRANULOCYTES NFR BLD AUTO: 0.8 % (ref 0–0.43)
LYMPHOCYTES # BLD AUTO: 2.55 X10(3)/MCL (ref 0.6–4.6)
LYMPHOCYTES NFR BLD AUTO: 11.9 % (ref 16–44)
MAGNESIUM SERPL-MCNC: 1.7 MG/DL (ref 1.6–2.6)
MCH RBC QN AUTO: 30.2 PG (ref 27–31)
MCHC RBC AUTO-ENTMCNC: 32.6 GM/DL (ref 33–36)
MCV RBC AUTO: 92.7 FL (ref 80–94)
MONOCYTES # BLD AUTO: 1.11 X10(3)/MCL (ref 0.1–1.3)
MONOCYTES NFR BLD AUTO: 5.2 % (ref 4–12.1)
NEUTROPHILS # BLD AUTO: 17.09 X10(3)/MCL (ref 2.1–9.2)
NEUTROPHILS NFR BLD AUTO: 79.9 % (ref 43–73)
NRBC BLD AUTO-RTO: 0 % (ref 0–0.2)
PHOSPHATE SERPL-MCNC: 3.2 MG/DL (ref 2.3–4.7)
PLATELET # BLD AUTO: 180 X10(3)/MCL (ref 130–400)
PMV BLD AUTO: 9.8 FL (ref 7.4–10.4)
POTASSIUM SERPL-SCNC: 3.1 MMOL/L (ref 3.5–5.1)
PROT SERPL-MCNC: 6.9 GM/DL (ref 5.8–7.6)
RBC # BLD AUTO: 3.01 X10(6)/MCL (ref 4.2–5.4)
SODIUM SERPL-SCNC: 145 MMOL/L (ref 136–145)
WBC # SPEC AUTO: 21.4 X10(3)/MCL (ref 4.5–11.5)

## 2021-12-26 LAB
BUN SERPL-MCNC: 13.9 MG/DL (ref 9.8–20.1)
CALCIUM SERPL-MCNC: 10.7 MG/DL (ref 8.4–10.2)
CHLORIDE SERPL-SCNC: 110 MMOL/L (ref 98–107)
CO2 SERPL-SCNC: 23 MMOL/L (ref 23–31)
CREAT SERPL-MCNC: 0.79 MG/DL (ref 0.57–1.11)
CREAT/UREA NIT SERPL: 18
GLUCOSE SERPL-MCNC: 96 MG/DL (ref 82–115)
MAGNESIUM SERPL-MCNC: 1.7 MG/DL (ref 1.6–2.6)
POTASSIUM SERPL-SCNC: 3.4 MMOL/L (ref 3.5–5.1)
SODIUM SERPL-SCNC: 146 MMOL/L (ref 136–145)

## 2021-12-27 LAB
ABS NEUT (OLG): 15.34 X10(3)/MCL (ref 2.1–9.2)
ALBUMIN SERPL-MCNC: 2.2 GM/DL (ref 3.4–4.8)
ALBUMIN/GLOB SERPL: 0.6 RATIO (ref 1.1–2)
ALP SERPL-CCNC: 171 UNIT/L (ref 40–150)
ALT SERPL-CCNC: 65 UNIT/L (ref 0–55)
AST SERPL-CCNC: 60 UNIT/L (ref 5–34)
BASOPHILS # BLD AUTO: 0.05 X10(3)/MCL (ref 0–0.2)
BASOPHILS NFR BLD AUTO: 0.3 % (ref 0–1)
BILIRUB SERPL-MCNC: 0.9 MG/DL (ref 0.2–1.2)
BILIRUBIN DIRECT+TOT PNL SERPL-MCNC: 0.4 MG/DL (ref 0–0.8)
BILIRUBIN DIRECT+TOT PNL SERPL-MCNC: 0.5 MG/DL (ref 0–0.5)
BUN SERPL-MCNC: 12.5 MG/DL (ref 9.8–20.1)
CALCIUM SERPL-MCNC: 10 MG/DL (ref 8.4–10.2)
CHLORIDE SERPL-SCNC: 105 MMOL/L (ref 98–107)
CO2 SERPL-SCNC: 27 MMOL/L (ref 23–31)
CREAT SERPL-MCNC: 0.79 MG/DL (ref 0.57–1.11)
EOSINOPHIL # BLD AUTO: 0.25 X10(3)/MCL (ref 0–0.9)
EOSINOPHIL NFR BLD AUTO: 1.3 % (ref 0–6.4)
ERYTHROCYTE [DISTWIDTH] IN BLOOD BY AUTOMATED COUNT: 13.7 % (ref 11.5–17)
GLOBULIN SER-MCNC: 3.9 GM/DL (ref 2.4–3.5)
GLUCOSE SERPL-MCNC: 117 MG/DL (ref 82–115)
HCT VFR BLD AUTO: 25.8 % (ref 37–47)
HGB BLD-MCNC: 8.6 GM/DL (ref 12–16)
IMM GRANULOCYTES # BLD AUTO: 0.2 10*3/UL (ref 0–0.02)
IMM GRANULOCYTES NFR BLD AUTO: 1 % (ref 0–0.43)
LYMPHOCYTES # BLD AUTO: 2.56 X10(3)/MCL (ref 0.6–4.6)
LYMPHOCYTES NFR BLD AUTO: 13 % (ref 16–44)
MAGNESIUM SERPL-MCNC: 1.6 MG/DL (ref 1.6–2.6)
MCH RBC QN AUTO: 30.3 PG (ref 27–31)
MCHC RBC AUTO-ENTMCNC: 33.3 GM/DL (ref 33–36)
MCV RBC AUTO: 90.8 FL (ref 80–94)
MONOCYTES # BLD AUTO: 1.24 X10(3)/MCL (ref 0.1–1.3)
MONOCYTES NFR BLD AUTO: 6.3 % (ref 4–12.1)
NEUTROPHILS # BLD AUTO: 15.34 X10(3)/MCL (ref 2.1–9.2)
NEUTROPHILS NFR BLD AUTO: 78.1 % (ref 43–73)
NRBC BLD AUTO-RTO: 0 % (ref 0–0.2)
PLATELET # BLD AUTO: 160 X10(3)/MCL (ref 130–400)
PMV BLD AUTO: 9.8 FL (ref 7.4–10.4)
POTASSIUM SERPL-SCNC: 3.1 MMOL/L (ref 3.5–5.1)
PREALB SERPL-MCNC: 6.7 MG/DL (ref 14–37)
PROT SERPL-MCNC: 6.1 GM/DL (ref 5.8–7.6)
RBC # BLD AUTO: 2.84 X10(6)/MCL (ref 4.2–5.4)
SODIUM SERPL-SCNC: 142 MMOL/L (ref 136–145)
WBC # SPEC AUTO: 19.6 X10(3)/MCL (ref 4.5–11.5)

## 2022-04-10 ENCOUNTER — HISTORICAL (OUTPATIENT)
Dept: ADMINISTRATIVE | Facility: HOSPITAL | Age: 82
End: 2022-04-10

## 2022-04-29 VITALS
BODY MASS INDEX: 32.63 KG/M2 | SYSTOLIC BLOOD PRESSURE: 124 MMHG | HEIGHT: 67 IN | WEIGHT: 207.88 LBS | DIASTOLIC BLOOD PRESSURE: 78 MMHG | HEIGHT: 67 IN | WEIGHT: 198.44 LBS | DIASTOLIC BLOOD PRESSURE: 84 MMHG | BODY MASS INDEX: 31.15 KG/M2 | SYSTOLIC BLOOD PRESSURE: 134 MMHG

## 2022-04-30 NOTE — CONSULTS
DATE OF CONSULTATION:      ATTENDING PHYSICIAN:  Slava Bonilla MD  CONSULTING PHYSICIAN:  Harvey Way MD    We are called in consultation to see this 81-year-old black female who has had a history of arterial hypertension and prior stroke and was admitted to [facility] with left-sided weakness.  The patient was found to have a very significant hypokalemia and she was started on potassium supplements and aldactone. Improving her serum potassium.  The patient has had by MRI, showing acute right nonhemorrhagic cortical base stroke.  We are called in consultation to provide management for electrolyte disturbances.    ALLERGIES:  No known allergies.    MEDICATIONS:  List can be found in the medication section of the chart.    LABORATORY ANALYSIS:  Today showed hemoglobin and hematocrit 11.2 and 34% with white blood cell count 15,200.  Platelet count within normal limits.  Her serum sodium, potassium, chloride, and CO2 were all within normal limits today.  BUN 10.6, creatinine 0.86.  Her serum iron was 39, saturation 27%.  She had been started on oral iron supplements.    PHYSICAL EXAMINATION:  GENERAL:  The patient is fully alert, oriented.  She is receiving physical therapy now.   VITAL SIGNS:  Blood pressure 136/84 with a heart rate of 89, regular, respiration 18.  Temperature 36.6 degrees Celsius.   HEENT:  Pupils equally reactive to light.  Ears and nose free of lesions.   NECK:  Without jugular venous distention or masses.   CHEST:  Clear.   HEART:  Having regular rhythm.   ABDOMEN:  Soft, nontender.  No masses palpated.   EXTREMITIES:  Were free of cyanosis, clubbing, or edema.    ASSESSMENT:    1. Hypokalemia, which at the present time, is resolved.  2. Anemia with iron deficiency.  She has better hemoglobin and is receiving oral iron supplements.  3. Atrial fibrillation history with regular rhythm now.    PLAN:    1. Continue current treatment.    2.   Repeat  laboratory analysis tomorrow morning.   Thank you very much for allowing us to participate in the care of this lady.        ______________________________  Harvey Way MD    [facility]  DD:  11/30/2021  Time:  11:37AM  DT:  11/30/2021  Time:  02:06PM  Job #:  [id number]    cc: Slava Bonilla MD

## 2022-04-30 NOTE — DISCHARGE SUMMARY
Patient:   Isamar Martinez             MRN: [id number]            FIN: [id number]               Age:   81 years     Sex:  Female     :  1940   Associated Diagnoses:   None   Author:   Vania Villa      Date of Service: 2021      Discharge Information      Discharge Summary Information   Date of Admission: 2021  Date of Discharge: 2021  Admit Diagnosis: Acute right nonhemorrhagic cortical based CVAs         Hypertension         Moderate/severe knee DJD         Reactive leukocytosis         Hypokalemia         Normocytic anemia         Presumed atrial fibrillation         Hyperlipidemia  Discharge Diagnosis: Acute right nonhemorrhagic cortical based CVAs (stable)            Hypertension (stable)            Moderate/severe knee DJD (stable)            Reactive leukocytosis (stable)            Hypokalemia (stable)            Normocytic anemia (stable)            Presumed atrial fibrillation (stable)            Hyperlipidemia (stable)            Hypercalcemia 2/2 metastatic cancer (current)            Asymptomatic bacteriuria (stable)            Oropharyngeal dysphagia (current)     COVID-19 testing: Negative on 2021  COVID-19 vaccination status: Vaccinated Moderna (3/30/2021 and 2021)    Internal Medicine (attending): Slava Bonilla MD  Physiatry (consulting): Jozef Clemente MD  Nephrology: Harvey Nice MD    OUTPATIENT PROVIDERS  PCP: Alicia Faustin MD  Cardiology: Slava Alcazar MD  Neurology: Rashmi Ricks MD  Nephrology: Conrad Kellogg MD      Hospital Course   81-year-old AAF presented to [facility] on 2021 with complaints of left-sided weakness and left-sided deviated gaze.  PMH significant for hypertension, hyperlipidemia, obesity, and previous CVA.  CT head no acute findings.  Previously admitted to [facility] on 2021 for right MCA CVA and discharged with plans for Linq device placement outpatient.  Neurology initiated Eliquis 5 mg twice daily.  MRI brain  significant for acute right nonhemorrhagic cortical-based CVAs.  Initiated Rocephin to treat UTI.  Nephrology replaced potassium as needed and discontinued HCTZ.  On 11/26 tolerated Linq device without complications.   Tolerated transfer to [facility] inpatient rehab unit on 11/29 without incident.    Throughout inpatient rehab course remained incontinent of bowel and bladder. EKG on 12/1 with normal sinus rhythm. On 12/6 therapy reported tachycardia of heart rate 140s at 1300. Initiated metoprolol tartrate 12.5 mg twice daily and metoprolol 10 mg IVP every 2 hours as needed heart rate sustained over 120. EKG significant for sinus tachycardia. On 12/8 labs significant for hypercalcemia. Initiated 1 L normal saline. Reported left foot pain. On 12/9 left ankle x-ray unremarkable. Left foot x-ray with no displaced fracture or dislocation identified, degenerative changes with small osteophytic spurring of the plantar aspect of the calcaneus. Throughout remainder of inpatient rehab course pain management improved. On 12/9 reported confusion. UA unremarkable. Confusion resolved on 12/10. Chest x-ray unremarkable. On 12/11 leukocytosis with no associated fevers, trended up. Transaminitis trended up. Denied any abdominal pain, abdomen soft and nondistended. Initiated 2 L normal saline. Continued with hypercalcemia. Phosphorus and intact PTH within normal limits. Nephrology continued to follow throughout inpatient rehab course. On 12/16 therapy reported progressive left-sided weakness. CT head without contrast with no acute intracranial findings identified.  On 12/20 hypercalcemia continued.  Initiated another 2 L normal saline.  Continued with leukocytosis.  On 12/21 initiated Cipro 500 mg daily 2/2 asymptomatic bacteriuria.  Speech therapy placed n.p.o. 2/2 coughing with thin liquids and pills.  Nephrology ordered nuclear medicine bone scan 2/2 continued hypercalcemia.  On 12/22 initiated Clinimix 2/2 unable to initiate NG  tube.  Failed MBS and consulted general surgery for PEG tube placement.  Urine culture began growing gamma strep and initiated Zosyn 3.375 g every 8 hours pending culture and sensitivity.  On 12/23 initiated NG tube and tube feedings.  Discontinued Clinimix.  Urine culture and sensitivity grew enterococcus faecalis. Discontinue Zosyn and initiate Cipro 500 mg twice daily x14 days.  On 12/24 liver enzymes continued to trend up along with hypercalcemia.  Initiated Fosamax 70 mg q. weekly.  NG tube pulled out.  Unable to reinsert NG tube.  Initiated Clinimix.  Changed oral Cipro to Cipro 400 mg IVP twice daily.  Bone scan nonspecific and recommended CT abdomen pelvis.  CT abdomen/pelvis with and without contrast significant for widespread osseous metastatic disease.  Possible minimal displaced fracture of the right sixth and left seventh ribs.  Multiple borderlineand large portacaval, periaortic and lower paraesophageal lymph nodes, suspicious for metastatic disease given osseous findings.  Suspected cholelithiasis.  Discussed diagnosis and probable prognosis with family (daughters and grand daughter).   Discussed CODE STATUS/more specifically DO NOT RESUSCITATE in the presence of metastasis with progressive clinical decline.  Discussed consulting oncology to evaluate.  Discussed current and future plan of care including aggressive treatment versus palliative/hospice care.  NP and family discussed prognosis and plan with patient, however she did not appear to fully comprehend.  , children, and grandchildren were all at bedside.  Continued current POC at this time.  Held PEG placement.  Continued aggressive fluid replacement in the presence of hypercalcemia 2/2 metastatic disease.  Unable to receive p.o. medications due to n.p.o. status and no PEG or NG tube intact.  On 12/27 vital signs overall at goal.  Tachycardia appreciated.  Medications currently on hold 2/2 n.p.o. status with no NG tube.  As needed  hypertensive and tachycardia medications on MAR.  Oriented to self (lethargic and dysarthric).  Labs reviewed.  Hypokalemia.  Initiate potassium 40 mEq IVP once now.  Magnesium level within normal limits.  Hypercalcemia improved.  Leukocytosis stable.  H&H stable.  Transaminitis trended up slightly.  Albumin and prealbumin trended down.  Discussed condition with daughter.  Family wants to continue CODE STATUS of full code.  Understands risks versus benefits.  Answered all questions.  Family would like oncology work-up to determine type and stage of cancer.  Discussed possible home with hospice in near future.  Transfer initiated for oncology work-up per Dr. Corrales.  Hospitalist admittance accepted and will consult oncology once transferred.  Discussed with Aquiles, hospitalist NP.  Initiated full dose Lovenox 2/2 n.p.o. and no Eliquis given at this time.  Med rec and discharge orders initiated.  Family notified.    Chief Complaint: Acute right nonhemorrhagic cortical-based CVAs with associated left-sided weakness       Physical Examination      Vital Signs (last 24 hrs)_____  Last Charted___________  Temp Oral      37.6 DegC  (DEC 27 14:56)  Heart Rate Peripheral   H 106bpm  (DEC 27 14:56)  SBP      133 mmHg  (DEC 27 14:56)  DBP      75 mmHg  (DEC 27 14:56)  SpO2      L 93%  (DEC 27 14:56)     General:  No acute distress, Alert.  Oriented to self..    Eye:  Pupils are equal, round and reactive to light, Vision unchanged.    HENT:  Normocephalic.    Neck:  Supple, Non-tender.    Respiratory:  Lungs are clear to auscultation, Respirations are non-labored, No chest wall tenderness.    Cardiovascular:  Normal rate, Regular rhythm, No murmur, No edema.    Gastrointestinal:  Soft, Non-tender, Normal bowel sounds  Musculoskeletal:  Normal range of motion, left sided weakness.    Neurologic:  Alert, Oriented, Normal sensory, Normal motor function, No focal deficits, Cranial Nerves II-XII are grossly intact.    Cognition and  Speech:  More alert today.  Oriented only to self..    Psychiatric:       Mood and affect: Depressed, Flat.       Results Review   General results   Most recent results   Discrete results only   12/28/2021 6:22 CST      WBC                       19.6 x10(3)/mcL  HI                             RBC                       3.00 x10(6)/mcL  LOW                             Hgb                       9.1 gm/dL  LOW                             Hct                       28.0 %  LOW                             Platelet                  169 x10(3)/mcL                             MCV                       93.3 fL                             MCH                       30.3 pg                             MCHC                      32.5 gm/dL  LOW                             RDW                       13.7 %                             MPV                       10.1 fL                             Abs Neut                  16.05 x10(3)/mcL  HI                             Neutro Auto               82 %  NA                             Lymph Auto                10 %  NA                             Mono Auto                 5 %  NA                             Eos Auto                  1 %  NA                             Abs Eos                   0.3 x10(3)/mcL                             Basophil Auto             0 %  NA                             Abs Neutro                16.05 x10(3)/mcL  HI                             Abs Lymph                 2.0 x10(3)/mcL                             Abs Mono                  0.9 x10(3)/mcL                             Abs Baso                  0.1 x10(3)/mcL                             IG%                       1  NA                             IG#                       0.28  NA                             Restick                   Done    12/28/2021 6:06 CST      Est Creat Clearance Ser   48.35 mL/min    12/28/2021 4:06 CST      Sodium Lvl                135 mmol/L  LOW                              Potassium Lvl             4.2 mmol/L                             Chloride                  100 mmol/L                             CO2                       23 mmol/L                             Calcium Lvl               9.5 mg/dL                             Glucose Lvl               102 mg/dL                             BUN                       14.6 mg/dL                             Creatinine                0.90 mg/dL                             eGFR-AA                   >60  NA                             eGFR-JASMYNE                  >60 mL/min/1.73 m2  NA                             Bili Total                1.0 mg/dL                             Bili Direct               0.2 mg/dL                             Bili Indirect             0.80 mg/dL                             AST                       96 unit/L  HI                             ALT                       81 unit/L  HI                             Alk Phos                  168 unit/L  HI                             Total Protein             6.6 gm/dL                             Albumin Lvl               2.0 gm/dL  LOW                             Globulin                  4.6 gm/dL  HI                             A/G Ratio                 0.4 ratio  LOW        Radiology results   MRI, Brain , Reported at  11/23/2021 13:32:00, Interpretation:  Acute nonhemorrhagic right striatocapsular 2.8 cm infarct. Subcentimeter acute nonhemorrhagic cortical based infarcts supratentorially (R>>L) and in the right cerebellar hemisphere.   Radiology results   X-ray, Left foot , Reported at  12/8/2021 11:28:00, Interpretation:  Degenerative changes are present throughout the midfoot. Additionally, there is small osteophytic spurring at the plantar aspect of the calcaneus. No convincing acutely displaced fracture or dislocation is identified. No aggressive osseous lesion or periosteal reaction is appreciated. The included soft tissues are without acute abnormality.   Radiology  results   CT, Head , Reported at  12/17/2021 05:36:00, Interpretation:  No acute intracranial findings identified. Multiple old infarct, chronic microvascular ischemia and atrophy.   Radiology results   CT, With contrast, Abdomen/pelvis , Reported at  12/24/2021 11:50:00, Interpretation:  Mildly limited exam. Widespread osseous metastatic disease. Possible minimally displaced fractures of the right 6th and left 7th ribs. Multiple borderline-enlarged portacaval, periaortic and lower paraesophageal lymph nodes, suspicious for metastatic disease given the osseous findings. Suspected cholelithiasis       Discharge Plan   Discharge Summary Plan   Discharge Status: stable.        Location: Discharge to [facility] oncology unit     Medications: See discharge medicine reconciliation     Activity: As tolerated     Diet: NPO     Instructions:  Take all medications as prescribed.          Attend appointments as scheduled.     Education: acute right nonhemorrhagic cortical based CVAs, hypercalcemia, metastatic cancer     Follow-up: To follow-up with hospitalist and oncologist within 24 hours of admit to [facility] oncology unit    Discussed plan of care, and patient communicated understanding. Agreed to comply with recommendations.    Discharge Time: 56 minutes

## 2022-05-02 NOTE — HISTORICAL [facility] CERNER
This is a historical note converted from Linda. Formatting and pictures may have been removed.  Please reference Linda for original formatting and attached multimedia. Chief Complaint  Post rodgers - hx of UTI + CVA  History of Present Illness  Mrs. Martinez is an 82 y/o female with PMH of HTN and Arthritis who?presented to [facility] ED on 8/19/21?after having facial droop and changes in speech approximately 3-4 days prior. Patient was admitted to telemetry ?on 8/19/21 for CVA involving large vessel. Stroke workup including echo with bubble study, CTA neck bilateral, and speech evaluation were ordered along with neuro checks q 6 hours. Home blood pressure medications were held for permissive hypertension. Neurology was consulted, and recommended continuing aspirin.?EKGs and troponins were also trended every 6 hours due to elevated troponin. On the first hospital day, morning labs showed potassium of 3.4; which was repleted via IV. Repeat potassium was 4.1 When patient went for Lexiscan, she was noted to be tachycardic, with heart rate in the 160s and found to be in SVT. No improvement with Valsalva and carotid massage, so patient was started on Metoprolol 5mg IV and heart rate came down to 93bpm.??Results of urine culture revealed E coli, so Vancomycin was discontinued and Zosyn continued. TTE revealed EF 55-60% and bubble study was negative; CTA neck showed no flow limiting stenosis. Troponins showed downtrend with no?ST- T wave changes on EKG. Patient was seen and examined on the morning of discharge. Neuro exam was within normal limits.Vital signs were stable overnight with no recurrence of SVT. Since workup for ACS was negative, patient was discharged on 8/21 with oral Macrobid 100 mg BID for UTI to be continued for 7 days, and?Metoprolol 25 mg daily  Review of Systems  11 point ROS reviewed and negative  Physical Exam  Vitals & Measurements  T:?36.5? ?C (Oral)? HR:?96(Peripheral)? RR:?18? BP:?124/84?  HT:?170.00?cm?  WT:?94.300?kg? BMI:?32.63?  General:?Alert and oriented, No acute distress.  Respiratory:?Lungs are clear to auscultation, Respirations are non-labored, Breath sounds are equal, Symmetrical chest wall expansion, No chest wall tenderness.  Cardiovascular:?Normal rate, Regular rhythm, No murmur, No gallop, Good pulses equal in all extremities, Normal peripheral perfusion, No edema.  Musculoskeletal:?Normal range of motion, Normal strength, No tenderness, No swelling, No deformity, Normal gait.  Integumentary: Warm, Pink, Intact, Moist, No pallor, No rash.  Cognition and Speech: Oriented, Speech clear and coherent, Functional cognition intact.?  Neuro :cranial nerves? intact,?no focal neurologic deficit, no slurring speech, no facial droop, muscle strength 5 out of 5  Assessment/Plan  1. CVA involving large vessel  Continue aspirin 81 mg oral tablet daily  ?  2. Hypertension  Continue metoprolol 25 mg oral daily and hydrochlorthiazide 25 mg oral tablet daily  ?  3. Hyperlipidemia  Continue atorvastatin 20 mg oral tablet 2 tablets daily  ?  4. Knee Pain- right  -x-ray of right knee ordered today  ?  Patient was scheduled to undergo Lexiscan as inpatient; however this was not done as patient developed SVT while en route to barb scan; was instructed to do lexiscan as outpatient; Barb scan and 30 day monitor were ordered today, and patient will follow up in 1 month  ?  Referrals  Clinic Follow up, *Est. 10/13/21 3:00:00 CDT, Order for future visit, CVA (cerebrovascular accident)  Hypertension  Hyperlipidemia, [facility]   Problem List/Past Medical History  Ongoing  Obesity  Historical  No qualifying data  Procedure/Surgical History  Insertion of Infusion Device into Left Basilic Vein, Percutaneous Approach (08/21/2021)  Hysterectomy   Medications  aspirin 81 mg oral tablet, CHEWABLE  atorvastatin 20 mg oral tablet, 40 mg= 2 tab(s), Oral, Daily  hydrochlorothiazide 25 mg oral tablet, 25 mg= 1 tab(s), Oral,  Daily  meloxicam 15 mg oral tablet, 15 mg= 1 tab(s), Oral, Daily  metoprolol succinate 25 mg oral tablet, extended release, 25 mg= 1 tab(s), Oral, Daily  Allergies  No Known Medication Allergies  Social History  Abuse/Neglect  No, No, Yes, 08/19/2021  Alcohol  Never, 09/13/2021  Employment/School  Retired, 09/13/2021  Exercise  Exercise frequency: 3-4 times/week. Exercise type: Walking., 09/13/2021  Home/Environment  Lives with Spouse. Living situation: Home/Independent. Shower chair, 09/13/2021  Spiritual/Cultural  Protestant, 09/13/2021  Substance Use  Never, 09/13/2021  Tobacco  Never (less than 100 in lifetime), N/A, 09/13/2021  Family History  Primary malignant neoplasm of breast: Sister.  Stroke: Sister.  Tobacco use.: Mother.  Immunizations  Vaccine Date Status   COVID-19 mRNA, LNP-S, PF - Moderna 04/27/2021 Recorded   COVID-19 mRNA, LNP-S, PF - Moderna 03/30/2021 Recorded   Health Maintenance  Health Maintenance  ???Pending?(in the next year)  ??? ??Due?  ??? ? ? ?Bone Density Screening due??09/13/21??Variable frequency  ??? ? ? ?Medicare Annual Wellness Exam due??09/13/21??and every 1??year(s)  ??? ??Refused?  ??? ? ? ?Tetanus Vaccine due??09/13/21??and every 10??year(s)  ??? ??Due In Future?  ??? ? ? ?Obesity Screening not due until??01/01/22??and every 1??year(s)  ??? ? ? ?Advance Directive not due until??01/02/22??and every 1??year(s)  ??? ? ? ?Cognitive Screening not due until??01/02/22??and every 1??year(s)  ??? ? ? ?Fall Risk Assessment not due until??01/02/22??and every 1??year(s)  ??? ? ? ?Functional Assessment not due until??01/02/22??and every 1??year(s)  ???Satisfied?(in the past 1 year)  ??? ??Satisfied?  ??? ? ? ?ADL Screening on??09/13/21.??Satisfied by Mary Plascencia LPN  ??? ? ? ?Advance Directive on??08/19/21.??Satisfied by Mei Meek  ??? ? ? ?Blood Pressure Screening on??09/13/21.??Satisfied by Mary Plascencia LPN  ??? ? ? ?Body Mass Index Check  on??09/13/21.??Satisfied by Mary Plascencia LPN  ??? ? ? ?Cognitive Screening on??09/13/21.??Satisfied by Mary Plascencia LPN  ??? ? ? ?Depression Screening on??09/13/21.??Satisfied by Mary Plascencia LPN  ??? ? ? ?Diabetes Screening on??08/21/21.??Satisfied by Ronnie Bahena  ??? ? ? ?Fall Risk Assessment on??09/13/21.??Satisfied by Mary Plascencia LPN  ??? ? ? ?Functional Assessment on??08/21/21.??Satisfied by Leland Parker PT  ??? ? ? ?Lipid Screening on??08/19/21.??Satisfied by Pako Parker  ??? ? ? ?Obesity Screening on??09/13/21.??Satisfied by Mary Plascencia LPN  ??? ??Refused?  ??? ? ? ?Tetanus Vaccine on??09/13/21.??Recorded by Mary Plascencia LPN??Reason: Patient Refuses  ?      Patient seen and examined,?reviewed with the resident,?agree with?assessment?and?plan.

## 2022-05-20 NOTE — HISTORICAL [facility] CERNER
This is a historical note converted from [employer]. Formatting and pictures may have been removed.  Please reference [employer] for original formatting and attached multimedia. Chief Complaint  CVA  Reason for Consultation  Physiatry  History of Present Illness  Admit MD: Slava Bonilla MD  Consult Physiatry: Jozef Clemente MD  HPI: 80yo BF with PMH of HTN, HLD, obesity, and previous stroke presented and was admitted on 11/21/2021 to [facility] with left-sided weakness and?facial drooping. ?She was found to be in A. fib.??Also evaluated by nephrology?2/2 severe hypokalemia. ?CT scan of the head was unremarkable. ?MRI of the brain confirmed acute nonhemorrhagic right 2.8 cm infarct, subcentimeter acute nonhemorrhagic cortical-based infarct supratentorially and in the right cerebellar hemisphere.?Cardiology consulted. ?She has been started on Eliquis and underwent a LINQ loop recorder placement on 11/26. ?Vital signs are stable, she is afebrile. ?Nephrology is concerned about the possibility of primary hyperaldosteronism. ?As such, the work-up for this has been started.?Participating in therapy. Functional status includes bed to WC transfer, toileting, and Amb w/RW for 5 ft requiring maximal assistance. Moderate assistance needed for bed mobility. Patient was evaluated, accepted, and admitted to inpatient rehab to improve functional status. Transferred to [facility] on 11/29 without incident.  11/30: Seen in patient room, seated in recliner with left lean. Nursing administering Flu vaccine as requested and OT preparing for bathing. Tells me that she does have some left sided weakness. Denies numbness and/or tingling. States that she was having some left shoulder pain last night. Able to sleep after medication helped the pain. Reports appetite is improving but not touching her breakfast in front of her. Bowels moved yesterday and day before per patient. Denies diarrhea. Therapy evaluating. VSSAF with noted tachycardia on 11/29. IM  following.  Review of Systems  Barriers to Discharge:  Social:Pt. completed 8th grade. ?She has no  history. ?She is a homemaker.  ?is retired. ?He had a CVA many years ago. ?He walks with a limp. He drives short distances. ?He helps clean the house. ?He does yard work. Daughter and granddaughter help with grocery shopping, finances, and managing meds. ?Also they help with cooking and cleaning as they are able.?Children (4)  Medical: acute nonhemorrhagic right infarct, left sided weakness, s/p  LINQ loop recorder placement, HTN, HLD, obesity, and previous stroke; possibility of primary hyperaldosteronism?  Functional:  Prior Level of Fx: Independent ADLs, does chores, and does laundry. ?She stopped driving in August when she had her last stroke.??Daughter helps manage finances and granddaughter? fills pill boxes for pt. and spouse. ?Pts spouse takes care of lawn maintenance. ?She does not have a medic alert.?  Residence: Pt. lives with spouse in [place] in a single-story home with 3 steps with rail on left going up to enter the residence. ?She uses a tub/shower combination with grab bars and HHS. ?She has an elevated toilet. ?No grab bars adjacent.?  DME: Shower chair and also possibly has a BSC  Psychiatric: Hx mental health/substance abuse: Pt. has no history of mental health or substance abuse issues.  Depression/Anxiety: no complaints?????  Pain:?left shoulder improved  acetaminophen 325 mg oral tablet)650 mg, form: Tab, Oral, q6hr PRN for pain  menthol topical, 1 brayden, form: Gel, TOP, TID PRN for pain, left shoulder  Bowels/Bladder: last BM?11/29? ????  Appetite:?fair??? ? ?  Sleep:?good?? ???  ?   ???  Physical Exam  Vitals & Measurements  T:?36.6? ?C (Oral)? TMIN:?36.6? ?C (Oral)? TMAX:?36.8? ?C (Oral)? HR:?89(Peripheral)? RR:?18? BP:?136/84? SpO2:?97%? WT:?87?kg?  General:?well-developed, well-nourished, in no acute distress  Eye: EOMI, clear conjunctiva, eyelids  normal  HENT:?normocephalic,??oropharynx and nasal mucosal surfaces moist  Neck: full range of motion, supple  Respiratory:?equal chest rise, no SOB, no audible wheeze  Cardiovascular:?regular rate and rhythm, no edema  Gastrointestinal:?soft, non-tender, non-distended?  Musculoskeletal:?left sided weakness  Integumentary: no rashes or skin lesions present  Neurologic: cranial nerves intact, no signs of peripheral neurological deficit, motor/sensory function intact  ?*MD performed and documented physical examination ??  Assessment/Plan  1.?CVA (cerebral vascular accident)?I63.9  2.?Left-sided weakness?R53.1  3.?Hypertension?I10  4.?Obesity?E66.9  5.?HLD (hyperlipidemia)?E78.5  6.?History of CVA in adulthood?Z86.73  Orders:  menthol topical, 1 brayden, form: Gel, TOP, TID PRN for pain, first dose 11/30/21 11:02:00 CST, left shoulder  Wounds:?no noted? ?  Precautions:? fall risk?? ? ?  Bracing/AD:?RW??? ?  Swallowing:?Regular Diet with Boost Plus BID??  Function: Tolerating therapy. Continue PT/OT/RT  VTE Prophylaxis:?  apixaban (Eliquis 5 mg oral tablet)5 mg, form: Tab, Oral, BID  Code Status:?FULL CODE?? ? ?  Discharge:??Pt. lives with spouse in [place] in a single-story home with 3 steps with rail on left going up to enter the residence. 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 ?He had a CVA many years ago. ?He walks with a limp. He drives short distances. ?He helps clean the house. ?He does yard work. Daughter and granddaughter help with grocery shopping, finances, and managing meds. ?Also they help with cooking and cleaning as they are able.?Children (4). Date pending.  I have?seen and examined patient?in initial Physiatry consult  case & medical records reviewed  I have done and signed? prescreen  Admit?history and PE?completed and reviewed and are consistent with findings on prescreen  I have reviewed case with Susan Mckenzie NP  Medical management by Dr Bonilla and his NPs- I have discussed case with them  PT,OT,ST,RT  evaluations ordered and in progress  Med Reconciliation completed and reviewed in EHR  I?have discussed? expected course with patient  Patient remains appropriate for, in need of, should tolerate and benefit from IRF  Familia Clemente MD  * I was involved in the creation of this note with Susan Mckenzie NP  ?  ?  ?  ?  ?  ?  ?   Pat Mckenzie?NP, conducted additional independent physical examination and assisted with medical documentation.????   Problem List/Past Medical History  Ongoing  History of CVA in adulthood  HLD (hyperlipidemia)  Hypertension  Obesity  Historical  No qualifying data  Procedure/Surgical History  Insertion of Infusion Device into Left Basilic Vein, Percutaneous Approach (08/21/2021)  Hysterectomy   Medications  Inpatient  acetaminophen 325 mg oral tablet, 325 mg= 1 tab(s), Oral, q4hr, PRN  acetaminophen 325 mg oral tablet, 650 mg= 2 tab(s), Oral, q6hr, PRN  Aldactone 25 mg oral tablet, 25 mg= 1 tab(s), Oral, Daily  amlodipine 5 mg oral tablet, 10 mg= 2 tab(s), Oral, Daily  Biofreeze 4% topical gel, 1 brayden, TOP, TID, PRN  Colace 100 mg oral capsule, 100 mg= 1 cap(s), Oral, BID  Dulcolax Laxative 10 mg RECTAL suppository, 10 mg= 1 supp, UT (rectal), q3day, PRN  Eliquis 5 mg oral tablet, 5 mg= 1 tab(s), Oral, BID  ferrous sulfate 300 mg/5 mL oral liquid, 300 mg= 5 mL, Oral, BIDAC  folic acid 1 mg oral tablet, 1 mg= 1 tab(s), Oral, Daily  hydrALAZINE, 10 mg= 0.5 mL, IV Push, q4hr, PRN  Influenza Virus Vaccine, Inactivated, 0.7 mL, IM, Daily  labetalol, 10 mg= 2 mL, IV Push, q10min, PRN  lactulose 10 g/15 mL oral syrup, 20 gm= 30 mL, Oral, Every other day, PRN  Lipitor 10 mg oral tablet, 20 mg= 2 tab(s), Oral, Daily  ondansetron 4 mg oral tablet, disintegrating, 8 mg= 2 tab(s), Oral, q8hr, PRN  Vistaril 25 mg oral capsule, 25 mg= 1 cap(s), Oral, At Bedtime, PRN  Home  Aldactone 25 mg oral tablet, 25 mg= 1 tab(s), Oral, Daily  amlodipine 5 mg oral tablet, 10 mg= 2 tab(s), Oral, Daily  Colace 100 mg oral  capsule, 100 mg= 1 cap(s), Oral, BID, PRN  Eliquis 5 mg oral tablet, 5 mg= 1 tab(s), Oral, BID  ferrous sulfate 300 mg/5 mL oral liquid, 300 mg= 5 mL, Oral, BIDAC  folic acid 1 mg oral tablet, 1 mg= 1 tab(s), Oral, Daily  Lipitor 10 mg oral tablet, 20 mg= 2 tab(s), Oral, Daily  metoprolol succinate 25 mg oral tablet, extended release, 25 mg= 1 tab(s), Oral, Daily, 3 refills  Vistaril 25 mg oral capsule, 25 mg= 1 cap(s), Oral, At Bedtime, PRN  Allergies  No Known Medication Allergies  Social History  Abuse/Neglect  No, No, Yes, 11/29/2021  No, No, Yes, 11/22/2021  No, No, Yes, 11/22/2021  No, No, Yes, 11/21/2021  No, No, Yes, 11/08/2021  Alcohol  Never, 11/22/2021  Never, 09/13/2021  Employment/School  Retired, 09/13/2021  Exercise  Exercise frequency: 3-4 times/week. Exercise type: Walking., 09/13/2021  Home/Environment  Lives with Spouse. Living situation: Home/Independent. Shower chair, 09/13/2021  Spiritual/Cultural  Oriental orthodox, 09/13/2021  Substance Use  Never, 09/13/2021  Tobacco  Never (less than 100 in lifetime), N/A, 11/29/2021  Never (less than 100 in lifetime), N/A, 11/22/2021  Never (less than 100 in lifetime), N/A, 11/21/2021  Never (less than 100 in lifetime), N/A, 09/13/2021  Family History  Primary malignant neoplasm of breast: Sister.  Stroke: Sister.  Tobacco use.: Mother.  Immunizations  Vaccine Date Status   influenza virus vaccine, inactivated 11/30/2021 Given   COVID-19 mRNA, LNP-S, PF - Moderna 04/27/2021 Recorded   COVID-19 mRNA, LNP-S, PF - Moderna 03/30/2021 Recorded   Lab Results  Test Name Test Result Date/Time   Sodium Lvl 140 mmol/L 11/30/2021 05:40 CST   Potassium Lvl 4.2 mmol/L 11/30/2021 05:40 CST   Chloride 105 mmol/L 11/30/2021 05:40 CST   CO2 26 mmol/L 11/30/2021 05:40 CST   Calcium Lvl 9.7 mg/dL 11/30/2021 05:40 CST   Glucose Lvl 93 mg/dL 11/30/2021 05:40 CST   BUN 10.6 mg/dL 11/30/2021 05:40 CST   Creatinine 0.86 mg/dL 11/30/2021 05:40 CST   Est Creat Clearance Ser 49.86 mL/min  11/30/2021 08:18 CST   eGFR-AA >60 11/30/2021 05:40 CST   eGFR-JASMYNE >60 mL/min/1.73 m2 11/30/2021 05:40 CST   Bili Total 1.0 mg/dL 11/30/2021 05:40 CST   Bili Direct 0.5 mg/dL 11/30/2021 05:40 CST   Bili Indirect 0.50 mg/dL 11/30/2021 05:40 CST   AST 58 unit/L (High) 11/30/2021 05:40 CST   ALT 51 unit/L 11/30/2021 05:40 CST   Alk Phos 143 unit/L 11/30/2021 05:40 CST   Total Protein 7.3 gm/dL 11/30/2021 05:40 CST   Albumin Lvl 2.8 gm/dL (Low) 11/30/2021 05:40 CST   Globulin 4.5 gm/dL (High) 11/30/2021 05:40 CST   A/G Ratio 0.6 ratio (Low) 11/30/2021 05:40 CST   Prealbumin 10.8 mg/dL (Low) 11/30/2021 05:40 CST   Chol 111 mg/dL 11/30/2021 05:40 CST   HDL 31 mg/dL (Low) 11/30/2021 05:40 CST   Trig 109 mg/dL 11/30/2021 05:40 CST   LDL 58.00 mg/dL 11/30/2021 05:40 CST   Chol/HDL 4 11/30/2021 05:40 CST   VLDL 22 11/30/2021 05:40 CST   WBC 15.2 x10(3)/mcL (High) 11/30/2021 05:40 CST   RBC 3.59 x10(6)/mcL (Low) 11/30/2021 05:40 CST   Hgb 11.2 gm/dL (Low) 11/30/2021 05:40 CST   Hct 34.0 % (Low) 11/30/2021 05:40 CST   Platelet 289 x10(3)/mcL 11/30/2021 05:40 CST   MCV 94.7 fL (High) 11/30/2021 05:40 CST   MCH 31.2 pg (High) 11/30/2021 05:40 CST   MCHC 32.9 gm/dL (Low) 11/30/2021 05:40 CST   RDW 13.5 % 11/30/2021 05:40 CST   MPV 9.2 fL 11/30/2021 05:40 CST   Abs Neut 11.12 x10(3)/mcL (High) 11/30/2021 05:40 CST   Neutro Auto 73.1 % (High) 11/30/2021 05:40 CST   Lymph Auto 19.6 % 11/30/2021 05:40 CST   Mono Auto 3.7 % (Low) 11/30/2021 05:40 CST   Eos Auto 2.6 % 11/30/2021 05:40 CST   Abs Eos 0.40 x10(3)/mcL 11/30/2021 05:40 CST   Basophil Auto 0.4 % 11/30/2021 05:40 CST   Abs Neutro 11.12 x10(3)/mcL (High) 11/30/2021 05:40 CST   Abs Lymph 2.98 x10(3)/mcL 11/30/2021 05:40 CST   Abs Mono 0.56 x10(3)/mcL 11/30/2021 05:40 CST   Abs Baso 0.06 x10(3)/mcL 11/30/2021 05:40 CST   NRBC% 0.0 % 11/30/2021 05:40 CST   IG% 0.600 % (High) 11/30/2021 05:40 CST   IG# 0.0900 (High) 11/30/2021 05:40 CST   Diagnostic Results  (11/21/2021 23:16 CST  CT Head W/O Contrast)  Impression  No acute intracranial hemorrhage. Findings of chronic microvascular  ischemic disease. [1]  ?  (11/21/2021 23:52 CST XR Knee Right 3 Views)  IMPRESSION: No acute osseous finding. Moderate severe tricompartmental  knee DJD.  Suprapatellar joint effusion. [2]  ?  (11/23/2021 13:32 CST MRI Brain W/O Contrast)  IMPRESSION:?  1. Acute nonhemorrhagic right striatocapsular 2.8 cm infarct.  2. Subcentimeter acute nonhemorrhagic cortical based infarcts  supratentorially (R>>L) and in the right cerebellar hemisphere. [3]     [1]?CT Head W/O Contrast; Cassidy CARPIO, Patrick Rizzo 11/21/2021 23:16 CST  [2]?XR Knee Right 3 Views; Tk CARPIO, Sergei Mckeon 11/21/2021 23:52 CST  [3]?MRI Brain W/O Contrast; Nisreen CARPIO, Valentino Tomas 11/23/2021 13:32 CST